# Patient Record
Sex: FEMALE | Race: WHITE | NOT HISPANIC OR LATINO | Employment: UNEMPLOYED | URBAN - METROPOLITAN AREA
[De-identification: names, ages, dates, MRNs, and addresses within clinical notes are randomized per-mention and may not be internally consistent; named-entity substitution may affect disease eponyms.]

---

## 2020-06-02 PROBLEM — E03.9 HYPOTHYROIDISM: Status: ACTIVE | Noted: 2020-06-02

## 2020-06-02 PROBLEM — G47.00 INSOMNIA: Status: ACTIVE | Noted: 2020-06-02

## 2020-06-02 PROBLEM — R00.0 TACHYCARDIA: Status: ACTIVE | Noted: 2020-06-02

## 2020-06-02 PROBLEM — F32.A DEPRESSION: Status: ACTIVE | Noted: 2020-06-02

## 2020-06-02 PROBLEM — D50.9 ANEMIA, IRON DEFICIENCY: Status: ACTIVE | Noted: 2020-06-02

## 2020-06-02 PROBLEM — E53.8 DEFICIENCY OF OTHER SPECIFIED B GROUP VITAMINS: Status: ACTIVE | Noted: 2020-06-02

## 2020-06-02 RX ORDER — FLUTICASONE PROPIONATE 50 MCG
SPRAY, SUSPENSION (ML) NASAL AS NEEDED
COMMUNITY
Start: 2018-12-01 | End: 2020-06-03

## 2020-06-02 RX ORDER — LIDOCAINE 50 MG/G
OINTMENT TOPICAL AS NEEDED
COMMUNITY
Start: 2018-11-21 | End: 2020-06-03

## 2020-06-03 ENCOUNTER — OFFICE VISIT (OUTPATIENT)
Dept: CARDIOLOGY | Facility: CLINIC | Age: 35
End: 2020-06-03

## 2020-06-03 ENCOUNTER — TELEPHONE (OUTPATIENT)
Dept: NEUROLOGY | Facility: CLINIC | Age: 35
End: 2020-06-03

## 2020-06-03 VITALS
BODY MASS INDEX: 24.22 KG/M2 | HEIGHT: 71 IN | DIASTOLIC BLOOD PRESSURE: 78 MMHG | WEIGHT: 173 LBS | SYSTOLIC BLOOD PRESSURE: 126 MMHG | HEART RATE: 105 BPM

## 2020-06-03 DIAGNOSIS — R00.2 PALPITATIONS: ICD-10-CM

## 2020-06-03 DIAGNOSIS — F19.10 DRUG ABUSE (HCC): ICD-10-CM

## 2020-06-03 DIAGNOSIS — R00.0 TACHYCARDIA: Primary | ICD-10-CM

## 2020-06-03 PROCEDURE — 93000 ELECTROCARDIOGRAM COMPLETE: CPT | Performed by: INTERNAL MEDICINE

## 2020-06-03 PROCEDURE — 99204 OFFICE O/P NEW MOD 45 MIN: CPT | Performed by: INTERNAL MEDICINE

## 2020-06-03 RX ORDER — LAMOTRIGINE 25 MG/1
25 TABLET ORAL DAILY
COMMUNITY
Start: 2020-03-11 | End: 2020-06-03

## 2020-06-03 RX ORDER — METOPROLOL SUCCINATE 25 MG/1
25 TABLET, EXTENDED RELEASE ORAL DAILY
Qty: 90 TABLET | Refills: 3 | Status: SHIPPED | OUTPATIENT
Start: 2020-06-03

## 2020-06-03 RX ORDER — OLANZAPINE 2.5 MG/1
2.5 TABLET ORAL DAILY
COMMUNITY
Start: 2020-03-11 | End: 2020-06-03

## 2020-06-03 RX ORDER — CIPROFLOXACIN 500 MG/1
500 TABLET, FILM COATED ORAL 2 TIMES DAILY
COMMUNITY
End: 2020-07-20

## 2020-06-03 RX ORDER — GABAPENTIN 300 MG/1
300 CAPSULE ORAL 3 TIMES DAILY
COMMUNITY
Start: 2020-03-09 | End: 2020-06-03

## 2020-06-03 NOTE — TELEPHONE ENCOUNTER
PT'S MOTHER CALLED AND STATED THEY ARE TRYING TO GET HER INTO [facility] [place]  BUT THEY NEED CLEARANCE FROM A NEUROLOGIST  TO SAY EITHER SHE HAS SEIZURES OR NOT  TO GET HER IN THERE.  SHE WAS PREVIOUS PT WITH US . 4 YEARS AGO  AND WE CAN GET APPOINTMENT TILL NOV . SHE IS WONDERING IF THERE IS ANYWAY TO GET IN SOONER OR IF  A PHONE CALL WOULD WORK . [phone]

## 2020-06-03 NOTE — TELEPHONE ENCOUNTER
She will need to be seen if it has been that long to get evaluated you can put her on a cancellation list and see if anything comes up sooner. You may want to check to see if her pcp can give her a letter

## 2020-06-03 NOTE — PROGRESS NOTES
Date of Office Visit: 2020  Encounter Provider: Dr. Aamir Jeronimo  Place of Service: [facility] CARDIOLOG[facility] [place]  Patient Name: Karin Toribio  :1985  Provider, No Known    Chief Complaint   Patient presents with   • Rapid Heart Rate  Tachycardia  Clearance for addiction center admission     consult     History of Present Illness:    Patient is 34 years old white female whose past medical history is significant for heroine abuse, meth amphetamine abuse, who is referred to me for evaluation of tachycardia and clearance for drug addiction center admission.    Patient has longstanding history of heroin as well as methamphetamine abuse.  She is used intravenously.  She is homeless.  Her mother brought her today.  Mother told me that she wants to admit the patient to [facility] addiction [place].  She was taken up by that facility but was found to have tachycardia and she was drowsy.  She was released from that center to have evaluation by neurologist as well as cardiologist prior to the admission.    Patient is little bit drowsy but she is arousable.  She has attention span which is drifting.  Patient denies any chest pain.  Patient does not have any fever or chills or cough or expectoration.  Patient does not have stigmata for bacterial endocarditis on clinical exam.  Patient has multiple needle marks on the skin in the forearms.  Patient denies any significant chest pain or shortness of breath.  No orthopnea PND no significant leg edema noted.  Patient does complain of occasional palpitation.    Patient smokes 1 pack of cigarettes per day.  Patient abuses heroin and methamphetamine.  Patient does not abuse alcohol.    At this stage, I would recommend to start Toprol-XL 25 mg daily.  Patient is cleared to be admitted to addiction center at [facility].  I will proceed with echocardiogram.  I will see the patient as needed.  I discussed in detail pros and cons and benefits of compliance with the  therapy with patient as well as her mother.        Past Medical History:   Diagnosis Date   • Anxiety    • Depression    • Tachycardia          Past Surgical History:   Procedure Laterality Date   • APPENDECTOMY             Current Outpatient Medications:   •  ciprofloxacin (CIPRO) 500 MG tablet, Take 500 mg by mouth 2 (Two) Times a Day., Disp: , Rfl:   •  metoprolol succinate XL (TOPROL-XL) 25 MG 24 hr tablet, Take 1 tablet by mouth Daily., Disp: 90 tablet, Rfl: 3      Social History     Socioeconomic History   • Marital status: Single     Spouse name: Not on file   • Number of children: Not on file   • Years of education: Not on file   • Highest education level: Not on file   Tobacco Use   • Smoking status: Current Every Day Smoker     Packs/day: 0.50     Types: Cigarettes   Substance and Sexual Activity   • Alcohol use: Yes   • Drug use: Yes     Types: Heroin, Methamphetamines   • Sexual activity: Defer         Review of Systems   Constitution: Negative for chills and fever.   HENT: Negative for ear discharge and nosebleeds.    Eyes: Negative for discharge and redness.   Cardiovascular: Positive for palpitations. Negative for chest pain, orthopnea, paroxysmal nocturnal dyspnea and syncope.   Respiratory: Positive for shortness of breath. Negative for cough and wheezing.    Endocrine: Negative for heat intolerance.   Skin: Negative for rash.   Musculoskeletal: Negative for arthritis and myalgias.   Gastrointestinal: Negative for abdominal pain, melena, nausea and vomiting.   Genitourinary: Negative for dysuria and hematuria.   Neurological: Negative for dizziness, light-headedness, numbness and tremors.   Psychiatric/Behavioral: Negative for depression. The patient is not nervous/anxious.        Procedures      ECG 12 Lead  Date/Time: 6/3/2020 5:13 PM  Performed by: Aamir Jeronimo MD  Authorized by: Aamir Jeronimo MD   Previous ECG: no previous ECG available  Rhythm: sinus rhythm and sinus tachycardia    Clinical  "impression: normal ECG            ECG 12 Lead    (Results Pending)           Objective:    /78   Pulse 105   Ht 180.3 cm (70.98\")   Wt 78.5 kg (173 lb)   BMI 24.14 kg/m²         Physical Exam   Constitutional: She is oriented to person, place, and time. She appears well-developed and well-nourished.   HENT:   Head: Normocephalic and atraumatic.   Eyes: No scleral icterus.   Neck: No thyromegaly present.   Cardiovascular: Normal rate, regular rhythm and normal heart sounds. Exam reveals no gallop and no friction rub.   No murmur heard.  Pulmonary/Chest: Effort normal and breath sounds normal. No respiratory distress. She has no wheezes. She has no rales.   Abdominal: There is no tenderness.   Musculoskeletal: She exhibits no edema.   Lymphadenopathy:     She has no cervical adenopathy.   Neurological: She is alert and oriented to person, place, and time.   Skin: No rash noted. No erythema.   Psychiatric: She has a normal mood and affect.           Assessment:       Diagnosis Plan   1. Tachycardia  ECG 12 Lead    metoprolol succinate XL (TOPROL-XL) 25 MG 24 hr tablet    Adult Transthoracic Echo Complete W/ Cont if Necessary Per Protocol   2. Drug abuse (CMS/HCC)  ECG 12 Lead    metoprolol succinate XL (TOPROL-XL) 25 MG 24 hr tablet    Adult Transthoracic Echo Complete W/ Cont if Necessary Per Protocol   3. Palpitations  Adult Transthoracic Echo Complete W/ Cont if Necessary Per Protocol            Plan:       I would recommend to start Toprol-XL and echocardiogram would be done.  Patient is cleared to enter into addiction center program  "

## 2020-06-04 NOTE — TELEPHONE ENCOUNTER
I CALLED THE PT'S MOTHER AND LEFT VOICE MESSAGE THAT THE PT HAS TO COME INTO THE OFFICE TO BE SEEN AND THAT A MYCHART VIDEO VISIT CANNOT BE DONE.

## 2020-06-04 NOTE — TELEPHONE ENCOUNTER
PT'S MOTHER ANAI HERNADEZ CALLING TO SEE IF THE PT CAN DO A MYCHART VIDEO VISIT? PT'S MOTHER STATES THAT A LETTER HAS TO BE FROM A NEUROLOGIST. IF YOU CAN GIVE HER A CALL [phone]

## 2020-06-15 ENCOUNTER — APPOINTMENT (OUTPATIENT)
Dept: CARDIOLOGY | Facility: HOSPITAL | Age: 35
End: 2020-06-15

## 2020-07-20 ENCOUNTER — HOSPITAL ENCOUNTER (EMERGENCY)
Facility: HOSPITAL | Age: 35
Discharge: HOME | End: 2020-07-20
Attending: EMERGENCY MEDICINE | Admitting: EMERGENCY MEDICINE

## 2020-07-20 VITALS
TEMPERATURE: 99.5 F | HEART RATE: 99 BPM | SYSTOLIC BLOOD PRESSURE: 120 MMHG | DIASTOLIC BLOOD PRESSURE: 80 MMHG | OXYGEN SATURATION: 98 % | BODY MASS INDEX: 25.08 KG/M2 | RESPIRATION RATE: 18 BRPM | WEIGHT: 169.31 LBS | HEIGHT: 69 IN

## 2020-07-20 DIAGNOSIS — L02.416 ABSCESS OF LEFT LEG: Primary | ICD-10-CM

## 2020-07-20 PROCEDURE — 99283 EMERGENCY DEPT VISIT LOW MDM: CPT

## 2020-07-20 PROCEDURE — 87205 SMEAR GRAM STAIN: CPT | Performed by: EMERGENCY MEDICINE

## 2020-07-20 PROCEDURE — 87186 SC STD MICRODIL/AGAR DIL: CPT | Performed by: EMERGENCY MEDICINE

## 2020-07-20 PROCEDURE — 87077 CULTURE AEROBIC IDENTIFY: CPT | Performed by: EMERGENCY MEDICINE

## 2020-07-20 PROCEDURE — 87147 CULTURE TYPE IMMUNOLOGIC: CPT | Performed by: EMERGENCY MEDICINE

## 2020-07-20 PROCEDURE — 87070 CULTURE OTHR SPECIMN AEROBIC: CPT | Performed by: EMERGENCY MEDICINE

## 2020-07-20 RX ORDER — CLINDAMYCIN HYDROCHLORIDE 300 MG/1
300 CAPSULE ORAL 4 TIMES DAILY
Qty: 40 CAPSULE | Refills: 0 | Status: SHIPPED | OUTPATIENT
Start: 2020-07-20 | End: 2020-07-30

## 2020-07-20 RX ORDER — ACETAMINOPHEN 500 MG
1000 TABLET ORAL ONCE
Status: COMPLETED | OUTPATIENT
Start: 2020-07-20 | End: 2020-07-20

## 2020-07-20 RX ADMIN — ACETAMINOPHEN 1000 MG: 500 TABLET, FILM COATED ORAL at 21:23

## 2020-07-20 RX ADMIN — CLINDAMYCIN HYDROCHLORIDE 450 MG: 300 CAPSULE ORAL at 21:23

## 2020-07-21 NOTE — DISCHARGE INSTRUCTIONS
Follow-up with your primary doctor.  Return to the emergency room for any new or worsening symptoms or if you have any other questions or concerns.  Take medication as prescribed.  Remove packing in 2 days.  It is okay if packing falls out on its own.

## 2020-07-21 NOTE — ED NOTES
Reports an abscess to the left calf, sent from [facility] care center     Gena Dubose, RN  07/20/20 2008

## 2020-07-21 NOTE — ED PROVIDER NOTES
"Subjective   Chief complaint: Left leg abscess    34-year-old female presents with an abscess on her left lower leg.  She states she noticed it this morning.  She states she is prone to developing abscesses.  Patient has a history of methamphetamine abuse.  She denies any other complaints.  She has had no vomiting or diarrhea.  She denies any cough or congestion.      History provided by:  Patient      Review of Systems   HENT: Negative for congestion and sore throat.    Respiratory: Negative for cough and shortness of breath.    Cardiovascular: Negative for chest pain.   Gastrointestinal: Negative for abdominal pain and vomiting.   Musculoskeletal: Negative for back pain.   Neurological: Negative for headaches.       Past Medical History:   Diagnosis Date   • Anxiety    • Depression    • Tachycardia        Allergies   Allergen Reactions   • Contrast Dye Hives   • Sertraline Hcl Unknown - High Severity     Pt sts no allergy to Zoloft   • Venlafaxine Other (See Comments)     Pt sts she has not taking Effexor in past   • Iodinated Diagnostic Agents Unknown - High Severity       Past Surgical History:   Procedure Laterality Date   • APPENDECTOMY         History reviewed. No pertinent family history.    Social History     Socioeconomic History   • Marital status: Single     Spouse name: Not on file   • Number of children: Not on file   • Years of education: Not on file   • Highest education level: Not on file   Tobacco Use   • Smoking status: Current Every Day Smoker     Packs/day: 0.50     Types: Cigarettes   • Smokeless tobacco: Never Used   Substance and Sexual Activity   • Alcohol use: Yes   • Drug use: Yes     Types: Heroin, Methamphetamines   • Sexual activity: Defer       /88 (BP Location: Right arm, Patient Position: Sitting)   Pulse (!) 135   Temp 99.5 °F (37.5 °C) (Oral)   Resp 19   Ht 175.3 cm (69\")   Wt 76.8 kg (169 lb 5 oz)   LMP 06/20/2020   SpO2 97%   BMI 25.00 kg/m²       Objective "   Physical Exam   Constitutional: She is oriented to person, place, and time. She appears well-developed and well-nourished.   HENT:   Head: Normocephalic and atraumatic.   Eyes: Pupils are equal, round, and reactive to light. EOM are normal.   Cardiovascular: Regular rhythm and normal heart sounds. Tachycardia present.   Pulmonary/Chest: Effort normal and breath sounds normal. No respiratory distress.   Neurological: She is alert and oriented to person, place, and time.   Skin:   Multiple sores over the extremities consistent with picking from methamphetamine abuse.  There is a 2 cm area of induration, erythema, warmth to the left calf consistent with a small abscess.   Nursing note and vitals reviewed.      Incision & Drainage  Date/Time: 7/20/2020 9:12 PM  Performed by: Marco Loera MD  Authorized by: Marco Loera MD     Consent:     Consent obtained:  Verbal    Consent given by:  Patient  Location:     Type:  Abscess    Location:  Lower extremity    Lower extremity location:  Leg    Leg location:  L lower leg  Pre-procedure details:     Skin preparation:  Betadine  Anesthesia (see MAR for exact dosages):     Anesthesia method:  Local infiltration    Local anesthetic:  Lidocaine 2% WITH epi (2cc)  Procedure type:     Complexity:  Complex  Procedure details:     Incision types:  Stab incision    Scalpel blade:  11    Wound management:  Probed and deloculated    Drainage:  Bloody and purulent    Drainage amount:  Moderate    Wound treatment:  Wound left open    Packing materials:  1/4 in iodoform gauze  Post-procedure details:     Patient tolerance of procedure:  Tolerated well, no immediate complications               ED Course                                           MDM   Patient had incision and drainage performed as above.  She was given a dose of clindamycin in the emergency room.  She will be discharged with a prescription for clindamycin.  She is to follow-up with her primary doctor.      Final  diagnoses:   Abscess of left leg            Marco Loera MD  07/20/20 2232

## 2020-07-23 LAB
BACTERIA SPEC AEROBE CULT: ABNORMAL
BACTERIA SPEC AEROBE CULT: ABNORMAL
GRAM STN SPEC: ABNORMAL
GRAM STN SPEC: ABNORMAL